# Patient Record
Sex: FEMALE | Race: WHITE | NOT HISPANIC OR LATINO | URBAN - METROPOLITAN AREA
[De-identification: names, ages, dates, MRNs, and addresses within clinical notes are randomized per-mention and may not be internally consistent; named-entity substitution may affect disease eponyms.]

---

## 2023-08-29 ENCOUNTER — OFFICE VISIT (OUTPATIENT)
Dept: FAMILY MEDICINE CLINIC | Facility: CLINIC | Age: 58
End: 2023-08-29
Payer: COMMERCIAL

## 2023-08-29 VITALS
BODY MASS INDEX: 28.07 KG/M2 | HEART RATE: 91 BPM | OXYGEN SATURATION: 98 % | TEMPERATURE: 97.1 F | HEIGHT: 64 IN | WEIGHT: 164.4 LBS | DIASTOLIC BLOOD PRESSURE: 82 MMHG | SYSTOLIC BLOOD PRESSURE: 145 MMHG

## 2023-08-29 DIAGNOSIS — E78.2 MIXED HYPERLIPIDEMIA: Primary | ICD-10-CM

## 2023-08-29 NOTE — PROGRESS NOTES
"Chief Complaint  Establish Care (Seen OB/GYN had lab panel showed elevated cholesterol)    Subjective        Vidya Anton presents to [facility]  History of Present Illness  Patient had going to gynecologist only for many years. She was sent for lab work including lipid panel. She was told it was high and to see a pcp. Denies other medical concerns. Exercises routinely. Denies routine medication, but does take magnesium supplement and daily vitamin. Denies past surgeries. Significant family history of copd, depression. Denies smoking, eats a healthy diet. Maternal grandmother had heart disease.     Last mammogram was July 2023. Denies previous colonoscopy or cologuard.   Objective   Vital Signs:  /82 (BP Location: Right arm, Patient Position: Sitting, Cuff Size: Adult)   Pulse 91   Temp 97.1 øF (36.2 øC) (Temporal)   Ht 162.6 cm (64\")   Wt 74.6 kg (164 lb 6.4 oz)   SpO2 98%   BMI 28.22 kg/mý   Estimated body mass index is 28.22 kg/mý as calculated from the following:    Height as of this encounter: 162.6 cm (64\").    Weight as of this encounter: 74.6 kg (164 lb 6.4 oz).             Physical Exam  Constitutional:       Appearance: Normal appearance.   HENT:      Head: Normocephalic.   Eyes:      Extraocular Movements: Extraocular movements intact.      Pupils: Pupils are equal, round, and reactive to light.   Cardiovascular:      Rate and Rhythm: Normal rate and regular rhythm.   Pulmonary:      Effort: Pulmonary effort is normal.      Breath sounds: Normal breath sounds.   Musculoskeletal:         General: Normal range of motion.   Skin:     General: Skin is warm and dry.   Neurological:      General: No focal deficit present.      Mental Status: She is alert and oriented to person, place, and time.   Psychiatric:         Mood and Affect: Mood normal.      Result Review :                   Assessment and Plan   Diagnoses and all orders for this visit:    1. Mixed " hyperlipidemia (Primary)  -     CT Cardiac Calcium Score Without Dye; Future    Patient defers colonoscopy/colon cancer screening today.     Recheck labs and bp at 6 month follow up.          Follow Up   Return in about 6 months (around 2/29/2024) for Recheck.  Patient was given instructions and counseling regarding her condition or for health maintenance advice. Please see specific information pulled into the AVS if appropriate.

## 2023-10-12 DIAGNOSIS — E78.2 MIXED HYPERLIPIDEMIA: ICD-10-CM

## 2023-10-13 ENCOUNTER — TELEPHONE (OUTPATIENT)
Dept: FAMILY MEDICINE CLINIC | Facility: CLINIC | Age: 58
End: 2023-10-13

## 2023-10-13 NOTE — TELEPHONE ENCOUNTER
pSpoke with patient via telephone regarding CT Heart calcium score without contrast. Results good, calcium score 0, risk of coronary artery disease less than 5%. Patient notified, discussion regarding daily cholesterol lowering medication. Patient would like to not take daily lowering medications. Repeat lipid panel in one year and reorder CT heart calcium score in 2 years.

## 2023-10-13 NOTE — TELEPHONE ENCOUNTER
UNABLE TO WARM TRANSFER    Caller: Vidya Anton    Relationship to patient: Self    Best call back number: [phone]     Patient is needing: PATIENT MISSED A CALL TO GO OVER CT RESULTS. PLEASE CALL AGAIN.